# Patient Record
Sex: MALE | Race: WHITE | Employment: STUDENT | URBAN - NONMETROPOLITAN AREA
[De-identification: names, ages, dates, MRNs, and addresses within clinical notes are randomized per-mention and may not be internally consistent; named-entity substitution may affect disease eponyms.]

---

## 2019-10-03 ENCOUNTER — HOSPITAL ENCOUNTER (EMERGENCY)
Age: 24
Discharge: HOME | End: 2019-10-03
Attending: EMERGENCY MEDICINE
Payer: COMMERCIAL

## 2019-10-03 VITALS
HEIGHT: 68 IN | WEIGHT: 130 LBS | HEART RATE: 75 BPM | BODY MASS INDEX: 19.7 KG/M2 | RESPIRATION RATE: 18 BRPM | SYSTOLIC BLOOD PRESSURE: 124 MMHG | OXYGEN SATURATION: 98 % | DIASTOLIC BLOOD PRESSURE: 80 MMHG | TEMPERATURE: 98 F

## 2019-10-03 DIAGNOSIS — H66.012 ACUTE SUPPURATIVE OTITIS MEDIA OF LEFT EAR WITH SPONTANEOUS RUPTURE OF TYMPANIC MEMBRANE, RECURRENCE NOT SPECIFIED: Primary | ICD-10-CM

## 2019-10-03 PROCEDURE — 99282 EMERGENCY DEPT VISIT SF MDM: CPT

## 2019-10-03 PROCEDURE — 6370000000 HC RX 637 (ALT 250 FOR IP): Performed by: EMERGENCY MEDICINE

## 2019-10-03 RX ORDER — AMOXICILLIN 250 MG/1
500 CAPSULE ORAL EVERY 8 HOURS SCHEDULED
Status: DISCONTINUED | OUTPATIENT
Start: 2019-10-03 | End: 2019-10-03 | Stop reason: HOSPADM

## 2019-10-03 RX ORDER — AMOXICILLIN 500 MG/1
500 CAPSULE ORAL 4 TIMES DAILY
Qty: 40 CAPSULE | Refills: 0 | Status: SHIPPED | OUTPATIENT
Start: 2019-10-03 | End: 2019-10-13

## 2019-10-03 RX ORDER — MONTELUKAST SODIUM 10 MG/1
10 TABLET ORAL NIGHTLY
COMMUNITY

## 2019-10-03 RX ORDER — IBUPROFEN 200 MG
200 TABLET ORAL EVERY 6 HOURS PRN
COMMUNITY

## 2019-10-03 RX ADMIN — AMOXICILLIN 500 MG: 250 CAPSULE ORAL at 05:53

## 2019-10-03 ASSESSMENT — PAIN SCALES - GENERAL: PAINLEVEL_OUTOF10: 2

## 2019-10-03 ASSESSMENT — ENCOUNTER SYMPTOMS
VOMITING: 0
ABDOMINAL PAIN: 0
EYE PAIN: 0
SHORTNESS OF BREATH: 0

## 2023-07-14 ENCOUNTER — HOSPITAL ENCOUNTER (EMERGENCY)
Age: 28
Discharge: HOME | End: 2023-07-14
Attending: EMERGENCY MEDICINE
Payer: COMMERCIAL

## 2023-07-14 ENCOUNTER — APPOINTMENT (OUTPATIENT)
Dept: GENERAL RADIOLOGY | Age: 28
End: 2023-07-14
Payer: COMMERCIAL

## 2023-07-14 VITALS
RESPIRATION RATE: 18 BRPM | HEART RATE: 74 BPM | OXYGEN SATURATION: 99 % | DIASTOLIC BLOOD PRESSURE: 80 MMHG | SYSTOLIC BLOOD PRESSURE: 118 MMHG | TEMPERATURE: 98 F

## 2023-07-14 DIAGNOSIS — R21 RASH AND OTHER NONSPECIFIC SKIN ERUPTION: Primary | ICD-10-CM

## 2023-07-14 DIAGNOSIS — L03.119 CELLULITIS OF UPPER EXTREMITY, UNSPECIFIED LATERALITY: ICD-10-CM

## 2023-07-14 LAB
ALBUMIN SERPL BCP-MCNC: 4.2 GM/DL (ref 3.4–5)
ALP SERPL-CCNC: 78 U/L (ref 46–116)
ALT SERPL W P-5'-P-CCNC: 28 U/L (ref 14–63)
ANION GAP SERPL CALC-SCNC: 9 MEQ/L (ref 8–16)
AST SERPL W P-5'-P-CCNC: 18 U/L (ref 15–37)
BASOPHILS # BLD: 0.3 % (ref 0–3)
BASOPHILS ABSOLUTE: 0 THOU/MM3 (ref 0–0.1)
BILIRUB SERPL-MCNC: 0.4 MG/DL (ref 0.2–1)
BUN SERPL-MCNC: 16 MG/DL (ref 7–18)
CALCIUM SERPL-MCNC: 9.4 MG/DL (ref 8.5–10.1)
CHLORIDE SERPL-SCNC: 103 MEQ/L (ref 98–107)
CO2 SERPL-SCNC: 29 MEQ/L (ref 21–32)
CREAT SERPL-MCNC: 1.1 MG/DL (ref 0.6–1.3)
EOSINOPHILS ABSOLUTE: 0.5 THOU/MM3 (ref 0–0.5)
EOSINOPHILS RELATIVE PERCENT: 6.6 % (ref 0–4)
GFR SERPL CREATININE-BSD FRML MDRD: > 60 ML/MIN/1.73M2
GLUCOSE SERPL-MCNC: 122 MG/DL (ref 74–106)
HCT VFR BLD CALC: 43.5 % (ref 42–52)
HEMOGLOBIN: 15.1 GM/DL (ref 14–18)
IMMATURE GRANS (ABS): 0 THOU/MM3 (ref 0–0.07)
IMMATURE GRANULOCYTES: 0 %
LYMPHOCYTES # BLD AUTO: 27.3 % (ref 15–47)
LYMPHOCYTES ABSOLUTE: 1.9 THOU/MM3 (ref 1–4.8)
MCH RBC QN AUTO: 30.8 PG (ref 26–32)
MCHC RBC AUTO-ENTMCNC: 34.7 GM/DL (ref 31–35)
MCV RBC AUTO: 88.8 FL (ref 80–94)
MONOCYTES: 0.3 THOU/MM3 (ref 0.3–1.3)
MONOCYTES: 5 % (ref 0–12)
PDW BLD-RTO: 11.5 % (ref 11.5–14.9)
PLATELET # BLD AUTO: 174 THOU/MM3 (ref 130–400)
PMV BLD AUTO: 9.9 FL (ref 9.4–12.4)
POTASSIUM SERPL-SCNC: 3.7 MEQ/L (ref 3.5–5.1)
PROT SERPL-MCNC: 7.2 GM/DL (ref 6.4–8.2)
RBC # BLD: 4.9 MILL/MM3 (ref 4.5–6.1)
SEG NEUTROPHILS: 60.8 % (ref 43–75)
SEGMENTED NEUTROPHILS ABSOLUTE COUNT: 4.1 THOU/MM3 (ref 1.8–7.7)
SODIUM SERPL-SCNC: 141 MEQ/L (ref 136–145)
WBC # BLD: 6.8 THOU/MM3 (ref 4.8–10.8)

## 2023-07-14 PROCEDURE — 6360000002 HC RX W HCPCS: Performed by: EMERGENCY MEDICINE

## 2023-07-14 PROCEDURE — 36415 COLL VENOUS BLD VENIPUNCTURE: CPT

## 2023-07-14 PROCEDURE — 96372 THER/PROPH/DIAG INJ SC/IM: CPT

## 2023-07-14 PROCEDURE — 99284 EMERGENCY DEPT VISIT MOD MDM: CPT

## 2023-07-14 PROCEDURE — 80053 COMPREHEN METABOLIC PANEL: CPT

## 2023-07-14 PROCEDURE — 87040 BLOOD CULTURE FOR BACTERIA: CPT

## 2023-07-14 PROCEDURE — 85025 COMPLETE CBC W/AUTO DIFF WBC: CPT

## 2023-07-14 PROCEDURE — 73080 X-RAY EXAM OF ELBOW: CPT

## 2023-07-14 PROCEDURE — 2580000003 HC RX 258: Performed by: EMERGENCY MEDICINE

## 2023-07-14 PROCEDURE — 6370000000 HC RX 637 (ALT 250 FOR IP): Performed by: EMERGENCY MEDICINE

## 2023-07-14 PROCEDURE — 84550 ASSAY OF BLOOD/URIC ACID: CPT

## 2023-07-14 RX ORDER — CEPHALEXIN 500 MG/1
500 CAPSULE ORAL ONCE
Status: COMPLETED | OUTPATIENT
Start: 2023-07-14 | End: 2023-07-14

## 2023-07-14 RX ORDER — CEPHALEXIN 500 MG/1
500 CAPSULE ORAL 4 TIMES DAILY
Qty: 40 CAPSULE | Refills: 0 | Status: SHIPPED | OUTPATIENT
Start: 2023-07-14 | End: 2023-07-24

## 2023-07-14 RX ORDER — CLINDAMYCIN HYDROCHLORIDE 150 MG/1
300 CAPSULE ORAL ONCE
Status: COMPLETED | OUTPATIENT
Start: 2023-07-14 | End: 2023-07-14

## 2023-07-14 RX ORDER — PREDNISONE 20 MG/1
40 TABLET ORAL DAILY
Qty: 20 TABLET | Refills: 0 | Status: SHIPPED | OUTPATIENT
Start: 2023-07-14 | End: 2023-07-24

## 2023-07-14 RX ORDER — IBUPROFEN 200 MG
600 TABLET ORAL ONCE
Status: COMPLETED | OUTPATIENT
Start: 2023-07-14 | End: 2023-07-14

## 2023-07-14 RX ADMIN — METHYLPREDNISOLONE SODIUM SUCCINATE 80 MG: 125 INJECTION INTRAMUSCULAR; INTRAVENOUS at 20:59

## 2023-07-14 RX ADMIN — CLINDAMYCIN HYDROCHLORIDE 300 MG: 150 CAPSULE ORAL at 21:05

## 2023-07-14 RX ADMIN — IBUPROFEN 600 MG: 200 TABLET, FILM COATED ORAL at 21:00

## 2023-07-14 RX ADMIN — CEPHALEXIN 500 MG: 500 CAPSULE ORAL at 21:05

## 2023-07-14 ASSESSMENT — ENCOUNTER SYMPTOMS
CONSTIPATION: 0
EYE DISCHARGE: 0
ABDOMINAL PAIN: 0
ABDOMINAL DISTENTION: 0
TROUBLE SWALLOWING: 0
EYE REDNESS: 0
SINUS PRESSURE: 0
CHEST TIGHTNESS: 0
NAUSEA: 0
VOMITING: 0
EYE PAIN: 0
BLOOD IN STOOL: 0
VOICE CHANGE: 0
COUGH: 0
DIARRHEA: 0
BACK PAIN: 0
SORE THROAT: 0
CHOKING: 0
SHORTNESS OF BREATH: 0
WHEEZING: 0
PHOTOPHOBIA: 0
EYE ITCHING: 0
RHINORRHEA: 0

## 2023-07-14 ASSESSMENT — PAIN SCALES - GENERAL
PAINLEVEL_OUTOF10: 4
PAINLEVEL_OUTOF10: 6

## 2023-07-14 ASSESSMENT — PAIN DESCRIPTION - DESCRIPTORS: DESCRIPTORS: SORE

## 2023-07-14 ASSESSMENT — PAIN DESCRIPTION - ORIENTATION: ORIENTATION: RIGHT

## 2023-07-14 ASSESSMENT — PAIN DESCRIPTION - LOCATION
LOCATION: ELBOW
LOCATION: ELBOW

## 2023-07-15 LAB — URATE SERPL-MCNC: 5.4 MG/DL (ref 3.7–7)

## 2023-07-15 NOTE — DISCHARGE INSTRUCTIONS
Patient has what appears to be an atopic dermatitis due to poison ivy. The patient has been given steroids he is instructed to take those as prescribed. He is instructed to use Pepcid and Benadryl for any itching or rash. Patient may have an underlying cellulitis. Patient has been given Keflex he is instructed to take this as prescribed. Patient is instructed to follow-up with a primary care physician and do so within the next 1 to 2 days. Patient is instructed to return to the nearest emergency room immediately for any new or worsening complaints. Patient had swelling of the right elbow. This is most likely due to the infection and the rash however should he have continuing problems he should follow-up with the [facility]opedic [facility].

## 2023-07-15 NOTE — ED NOTES
Lab notified patient placed on keflex and cleocin.      Agnes Wilkinson RN  07/14/23 1824
Patient presents today complaining of a rash all over arms and trunk. Reports his wife has poison ivy and assumed he has the same thing, it was improving and some areas blistered, including right arm. He noted today his right elbow starting to swell and the area is warm and tender. Good sensation in right upper extremity.        Agnes Wilkinson RN  07/14/23 2026
Patient to xray.      Welford Barthel, RN  07/14/23 1466
Report to Ezequiel Mcleod RN.      Angelica Powell RN  07/14/23 2935
Reviewed discharged instructions with patient and his wife. Both verbalize understanding. All needs addressed and questions answered before patient discharged.       Tyson Irwin RN  07/14/23 1749
none

## 2023-07-16 LAB
BACTERIA BLD AEROBE CULT: NORMAL
BACTERIA BLD AEROBE CULT: NORMAL

## 2023-07-20 LAB
BACTERIA BLD AEROBE CULT: NORMAL
BACTERIA BLD AEROBE CULT: NORMAL